# Patient Record
Sex: MALE | Race: WHITE | Employment: FULL TIME | URBAN - NONMETROPOLITAN AREA
[De-identification: names, ages, dates, MRNs, and addresses within clinical notes are randomized per-mention and may not be internally consistent; named-entity substitution may affect disease eponyms.]

---

## 2017-09-13 ENCOUNTER — HOSPITAL ENCOUNTER (EMERGENCY)
Age: 57
Discharge: HOME | End: 2017-09-13
Payer: COMMERCIAL

## 2017-09-13 VITALS
SYSTOLIC BLOOD PRESSURE: 132 MMHG | RESPIRATION RATE: 18 BRPM | DIASTOLIC BLOOD PRESSURE: 91 MMHG | HEART RATE: 97 BPM | TEMPERATURE: 97.9 F | OXYGEN SATURATION: 92 %

## 2017-09-13 DIAGNOSIS — J20.9 ACUTE BRONCHITIS, UNSPECIFIED ORGANISM: Primary | ICD-10-CM

## 2017-09-13 PROCEDURE — 99213 OFFICE O/P EST LOW 20 MIN: CPT | Performed by: NURSE PRACTITIONER

## 2017-09-13 PROCEDURE — 99212 OFFICE O/P EST SF 10 MIN: CPT

## 2017-09-13 RX ORDER — BENZONATATE 200 MG/1
200 CAPSULE ORAL 3 TIMES DAILY PRN
Qty: 21 CAPSULE | Refills: 0 | Status: SHIPPED | OUTPATIENT
Start: 2017-09-13 | End: 2017-09-20

## 2017-09-13 RX ORDER — ALBUTEROL SULFATE 90 UG/1
2 AEROSOL, METERED RESPIRATORY (INHALATION) EVERY 6 HOURS PRN
Qty: 1 INHALER | Refills: 0 | Status: SHIPPED | OUTPATIENT
Start: 2017-09-13

## 2017-09-13 RX ORDER — PREDNISONE 20 MG/1
20 TABLET ORAL 2 TIMES DAILY
Qty: 10 TABLET | Refills: 0 | Status: SHIPPED | OUTPATIENT
Start: 2017-09-13 | End: 2017-09-18

## 2017-09-13 RX ORDER — LEVOFLOXACIN 500 MG/1
500 TABLET, FILM COATED ORAL DAILY
Qty: 5 TABLET | Refills: 0 | Status: SHIPPED | OUTPATIENT
Start: 2017-09-13 | End: 2017-09-18

## 2017-09-13 ASSESSMENT — ENCOUNTER SYMPTOMS
BACK PAIN: 0
EYE PAIN: 0
COLOR CHANGE: 0
NAUSEA: 0
WHEEZING: 0
CONSTIPATION: 0
SHORTNESS OF BREATH: 0
EYE DISCHARGE: 0
RHINORRHEA: 0
DIARRHEA: 0
COUGH: 1
VOMITING: 0
SORE THROAT: 0
ABDOMINAL PAIN: 0
STRIDOR: 0

## 2021-03-11 ENCOUNTER — IMMUNIZATION (OUTPATIENT)
Dept: PRIMARY CARE CLINIC | Age: 61
End: 2021-03-11
Payer: OTHER GOVERNMENT

## 2021-03-11 PROCEDURE — 91303 COVID-19, J&J VACCINE, PF, 0.5 ML DOSE: CPT | Performed by: FAMILY MEDICINE

## 2021-03-11 PROCEDURE — 0031A PR IMM ADMN SARSCOV2 AD26 5X1010VP/0.5 ML 1 DOSE: CPT | Performed by: FAMILY MEDICINE

## 2024-08-18 ENCOUNTER — HOSPITAL ENCOUNTER (EMERGENCY)
Age: 64
Discharge: HOME | End: 2024-08-18
Payer: OTHER GOVERNMENT

## 2024-08-18 VITALS
TEMPERATURE: 97.9 F | SYSTOLIC BLOOD PRESSURE: 138 MMHG | HEIGHT: 69 IN | WEIGHT: 150 LBS | BODY MASS INDEX: 22.22 KG/M2 | RESPIRATION RATE: 16 BRPM | OXYGEN SATURATION: 96 % | DIASTOLIC BLOOD PRESSURE: 84 MMHG | HEART RATE: 86 BPM

## 2024-08-18 DIAGNOSIS — S91.332A PUNCTURE WOUND OF LEFT FOOT, INITIAL ENCOUNTER: Primary | ICD-10-CM

## 2024-08-18 PROCEDURE — 6360000002 HC RX W HCPCS

## 2024-08-18 PROCEDURE — 90471 IMMUNIZATION ADMIN: CPT

## 2024-08-18 PROCEDURE — 99213 OFFICE O/P EST LOW 20 MIN: CPT

## 2024-08-18 PROCEDURE — 99203 OFFICE O/P NEW LOW 30 MIN: CPT

## 2024-08-18 PROCEDURE — 90715 TDAP VACCINE 7 YRS/> IM: CPT

## 2024-08-18 RX ORDER — IBUPROFEN 200 MG
400 TABLET ORAL EVERY 6 HOURS PRN
COMMUNITY

## 2024-08-18 RX ORDER — CEPHALEXIN 500 MG/1
500 CAPSULE ORAL 3 TIMES DAILY
Qty: 15 CAPSULE | Refills: 0 | Status: SHIPPED | OUTPATIENT
Start: 2024-08-18 | End: 2024-08-23

## 2024-08-18 RX ADMIN — TETANUS TOXOID, REDUCED DIPHTHERIA TOXOID AND ACELLULAR PERTUSSIS VACCINE, ADSORBED 0.5 ML: 5; 2.5; 8; 8; 2.5 SUSPENSION INTRAMUSCULAR at 17:10

## 2024-08-18 ASSESSMENT — PAIN - FUNCTIONAL ASSESSMENT: PAIN_FUNCTIONAL_ASSESSMENT: NONE - DENIES PAIN

## 2024-08-18 NOTE — ED TRIAGE NOTES
Arrives to [facility] for the evaluation of left foot puncture wound that occurred 2 days ago after stepping on a carmine nail.  Has been >5 years since last tetanus.  After stepping on and removing the nail from foot has been keeping site clean with soap and water.  Denies pain to the area at this time.  Did take Ibuprofen 400 mg today around 0800.  VSS.  YO Tavera in room to assess.

## 2024-08-18 NOTE — ED PROVIDER NOTES
[facility]ENT [facility]ARE  Urgent Care Encounter       CHIEF COMPLAINT       Chief Complaint   Patient presents with    Puncture Wound     Left Foot- Stepped on a carmine nail        Nurses Notes reviewed and I agree except as noted in the HPI.  HISTORY OF PRESENT ILLNESS   Felix Valdivia is a 64 y.o. male who presents to urgent care for stepping on a carmine nail.  Symptoms started 2 days ago.  Patient states he was wearing crocs and the nail went through the croc and his foot.  States about 1/4 inch of the nail was in his foot when he pulled it out.  Patient is not up-to-date on tetanus shot.  Denies fever, chills or bodyaches.  Patient states he soaked it in Epsom salt, cleaned it with antibacterial soap and placed bacitracin on it.    The history is provided by the patient. No  was used.       REVIEW OF SYSTEMS     Review of Systems   Constitutional: Negative.    HENT: Negative.     Eyes: Negative.    Respiratory: Negative.     Cardiovascular: Negative.    Gastrointestinal: Negative.    Endocrine: Negative.    Genitourinary: Negative.    Musculoskeletal: Negative.    Skin:  Positive for wound (left foot).   Allergic/Immunologic: Negative.    Neurological: Negative.    Hematological: Negative.    Psychiatric/Behavioral: Negative.         PAST MEDICAL HISTORY   History reviewed. No pertinent past medical history.    SURGICALHISTORY     Patient  has no past surgical history on file.    CURRENT MEDICATIONS       Previous Medications    ALBUTEROL SULFATE  (90 BASE) MCG/ACT INHALER    Inhale 2 puffs into the lungs every 6 hours as needed for Wheezing    IBUPROFEN (ADVIL;MOTRIN) 200 MG TABLET    Take 2 tablets by mouth every 6 hours as needed for Pain       ALLERGIES     Patient is is allergic to dye [iodides] and tomato.    Patients   Immunization History   Administered Date(s) Administered    COVID-19, J&J, (age 18y+), IM, 0.5 mL 03/11/2021    TDaP, ADACEL (age 10y-64y), BOOSTRIX

## 2024-08-18 NOTE — DISCHARGE INSTRUCTIONS
Medications as prescribed.  Can continue to use warm soaks, over-the-counter bacitracin to the area and clean wound with mild soap and water.  Follow-up with family doctor in 3 to 5 days.  Return for new or worsening symptoms.  Can use over-the-counter Motrin or Tylenol as needed for pain.

## 2024-11-20 ENCOUNTER — HOSPITAL ENCOUNTER (EMERGENCY)
Age: 64
Discharge: HOME | End: 2024-11-21
Payer: OTHER GOVERNMENT

## 2024-11-20 ENCOUNTER — APPOINTMENT (OUTPATIENT)
Dept: GENERAL RADIOLOGY | Age: 64
End: 2024-11-20
Payer: OTHER GOVERNMENT

## 2024-11-20 VITALS
HEIGHT: 69 IN | WEIGHT: 145 LBS | SYSTOLIC BLOOD PRESSURE: 158 MMHG | DIASTOLIC BLOOD PRESSURE: 101 MMHG | OXYGEN SATURATION: 96 % | BODY MASS INDEX: 21.48 KG/M2 | RESPIRATION RATE: 20 BRPM | TEMPERATURE: 97.3 F | HEART RATE: 98 BPM

## 2024-11-20 DIAGNOSIS — S86.892A PATELLAR TENDON AVULSION, LEFT, INITIAL ENCOUNTER: Primary | ICD-10-CM

## 2024-11-20 DIAGNOSIS — M25.562 ACUTE PAIN OF LEFT KNEE: ICD-10-CM

## 2024-11-20 PROCEDURE — 73564 X-RAY EXAM KNEE 4 OR MORE: CPT

## 2024-11-20 PROCEDURE — 99283 EMERGENCY DEPT VISIT LOW MDM: CPT

## 2024-11-20 ASSESSMENT — PAIN SCALES - GENERAL: PAINLEVEL_OUTOF10: 7

## 2024-11-20 ASSESSMENT — PAIN - FUNCTIONAL ASSESSMENT: PAIN_FUNCTIONAL_ASSESSMENT: 0-10

## 2024-11-20 ASSESSMENT — PAIN DESCRIPTION - LOCATION: LOCATION: KNEE

## 2024-11-21 NOTE — DISCHARGE INSTRUCTIONS
Go to [facility] today to walk in clinic from 8-4.  Use crutches and the knee immobilizer until you see them.  Tylenol and ibuprofen for pain

## 2024-11-21 NOTE — ED TRIAGE NOTES
Pt to the ED from lobby with complaints of knee pain. Pt states he was working on his truck when he felt a sharp pain. Pt states  \"I think my knee cap is out of place\" VS assessed and call light within reach.

## 2024-11-23 NOTE — ED PROVIDER NOTES
strain, fracture, dislocation                     Pertinent Comorbid Conditions:    Reviewed per the chart    2)  Data Reviewed (none if left blank, additional information can be found in the ED course)          My Independent interpretations:     EKG:           Imaging: No acute fracture, degenerative changes--I disagree with the reading and that I believe that there is a patellar tendon disruption as the patella seems to be displaced    Labs:                          Decision Rules/Clinical Scores utilized:                        Previous visit summary and patient history available on EMR and was reviewed.     History obtained from chart review and the patient.     Pertinent previous records reviewed:  previous notes, admissions and hospitalizations .    Code Status: Not addressed at time of initial evaluation             See Formal Diagnostic Results above for the lab and radiology tests and orders.         3)  Treatment and Disposition         ED Reassessment/Response to interventions:   Placed in knee immobilizer and given crutches     Crutches, Knee immobilizer, Verified splint applied appropriately with intact PMS, and Patient educated on use of splint/brace/crutches         Case discussed with consulting clinician/attending physician:  None/None         Shared Decision-Making was performed and disposition discussed with the       Patient/Family and questions answered          Social determinants of health impacting treatment or disposition:     4) MIPS  N/A                    Medical Decision Making  Patient seen evaluated the emergency room for left knee pain.  He states it locked up and he lost his balance.  Knee is slightly swollen.  X-rays are read as negative however I am concerned for left patellar tendon disruption.  Patient is placed in an immobilizer.  Given crutches.  Conservatively treat with Tylenol and ibuprofen and to see orthopedics tomorrow.  Return as needed and monitor closely if symptoms